# Patient Record
Sex: FEMALE | Race: BLACK OR AFRICAN AMERICAN
[De-identification: names, ages, dates, MRNs, and addresses within clinical notes are randomized per-mention and may not be internally consistent; named-entity substitution may affect disease eponyms.]

---

## 2018-05-17 ENCOUNTER — HOSPITAL ENCOUNTER (EMERGENCY)
Dept: HOSPITAL 97 - ER | Age: 33
Discharge: HOME | End: 2018-05-17
Payer: SELF-PAY
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
## 2018-05-17 DIAGNOSIS — S05.91XA: Primary | ICD-10-CM

## 2018-05-17 DIAGNOSIS — F17.210: ICD-10-CM
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
## 2018-05-17 PROCEDURE — 99282 EMERGENCY DEPT VISIT SF MDM: CPT
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
## 2018-05-17 NOTE — ER
Nurse's Notes                                                                                     

 [facility]                                                                

Name: Ayaan Esposito                                                                           

Age: 33 yrs                                                                                       

Sex: Female                                                                                       

: 1985                                                                                   

MRN: [id number]                                                                                   

Arrival Date: 2018                                                                          

Time: 08:30                                                                                       

Account#: [id number]                                                                            

Bed 15                                                                                            

Private MD: None, None                                                                            

Diagnosis: Local trauma to right eye                                                              

                                                                                                  

Presentation:                                                                                     

                                                                                             

08:41 Presenting complaint: Patient states: R eye pain and redness started last ,       hj  

      denies taking any eye drops and meds; reports trauma to the area, bat hit the R eye         

      from playing softball; reports discharges; denies blurry vision;. Transition of care:       

      patient was not received from another setting of care. Onset of symptoms was May 17,        

      2018. Initial Sepsis Screen: Does the patient meet any 2 criteria? No. Patient's            

      initial sepsis screen is negative. Does the patient have a suspected source of              

      infection? No. Patient's initial sepsis screen is negative. Care prior to arrival: None.    

08:41 Method Of Arrival: Ambulatory                                                             

08:41 Acuity: VANNESA 4                                                                           hj  

                                                                                                  

Triage Assessment:                                                                                

08:44 General: Appears in no apparent distress. uncomfortable, Behavior is calm, cooperative, hj  

      appropriate for age. Pain: Complains of pain in right eye. EENT: Reports pain               

      photophobia. Neuro: Level of Consciousness is awake, alert, obeys commands, Oriented to     

      person, place, time, situation, Appropriate for age. Cardiovascular: Capillary refill <     

      3 seconds Patient's skin is warm and dry. Respiratory: Airway is patent Respiratory         

      effort is even, unlabored, Respiratory pattern is regular, symmetrical. GI: No signs        

      and/or symptoms were reported involving the gastrointestinal system. : No signs           

      and/or symptoms were reported regarding the genitourinary system. Derm: No signs and/or     

      symptoms reported regarding the dermatologic system. Musculoskeletal: No signs and/or       

      symptoms reported regarding the musculoskeletal system.                                     

                                                                                                  

Historical:                                                                                       

- Allergies:                                                                                      

08:43 No Known Allergies;                                                                     hj  

- Home Meds:                                                                                      

08:43 None [Active];                                                                          hj  

- PMHx:                                                                                           

08:43 None;                                                                                   hj  

- PSHx:                                                                                           

08:43 None;                                                                                   hj  

                                                                                                  

- Immunization history:: Adult Immunizations up to date.                                          

- Social history:: Smoking status: Patient uses tobacco products, smokes one-half pack            

  cigarettes per day, Patient uses alcohol, occasionally.                                         

- Family history:: not pertinent.                                                                 

- Hospitalizations: : No recent hospitalization is reported.                                      

                                                                                                  

                                                                                                  

Screenin:44 Abuse screen: Denies threats or abuse. Denies injuries from another. Nutritional        hj  

      screening: No deficits noted. Tuberculosis screening: No symptoms or risk factors           

      identified. Fall Risk None identified.                                                      

                                                                                                  

Vital Signs:                                                                                      

08:45  / 100; Pulse 85; Resp 18; Temp 97.6(TE); Pulse Ox 100% on R/A; Weight 92.99 kg;  hj  

      Height 5 ft. 8 in. (172.72 cm);                                                             

08:45 Body Mass Index 31.17 (92.99 kg, 172.72 cm)                                               

                                                                                                  

ED Course:                                                                                        

08:30 Patient arrived in ED.                                                                  mr  

08:30 None, None is Private Physician.                                                        mr  

08:37 Hector Mccann MD is Attending Physician.                                                rn  

08:41 Mj Landis RN is Primary Nurse.                                                    hj  

08:42 Triage completed.                                                                       hj  

08:45 Arm band placed on right wrist.                                                         hj  

08:45 Patient has correct armband on for positive identification. Call light in reach. Side   hj  

      rails up X 1.                                                                               

08:54 Letty Victor MD is Referral Physician.                                               rn  

09:00 No provider procedures requiring assistance completed. Patient did not have IV access   hj  

      during this emergency room visit.                                                           

                                                                                                  

Administered Medications:                                                                         

No medications were administered                                                                  

                                                                                                  

                                                                                                  

Outcome:                                                                                          

08:54 Discharge ordered by MD.                                                                rn  

09:00 Discharged to home ambulatory.                                                          hj  

09:00 Condition: stable                                                                           

09:00 Discharge instructions given to patient, Instructed on discharge instructions, follow       

      up and referral plans. medication usage, Demonstrated understanding of instructions,        

      follow-up care, medications, Prescriptions given X 1.                                       

09:02 Patient left the ED.                                                                      

                                                                                                  

Signatures:                                                                                       

Vani Sams                                                                                   

Hector Mccann MD MD rn Joaquin, Henry, RN                      RN   loren                                                   

                                                                                                  

Corrections: (The following items were deleted from the chart)                                    

08:55 08:41 Presenting complaint: Patient states: R eye pain and redness started last , hj  

      denies taking any eye drops and meds; denies trauma to the area; reports discharges;        

      denies blurry vision; hj                                                                    

                                                                                                  

**************************************************************************************************

## 2019-01-14 ENCOUNTER — HOSPITAL ENCOUNTER (EMERGENCY)
Dept: HOSPITAL 97 - ER | Age: 34
Discharge: HOME | End: 2019-01-14
Payer: SELF-PAY

## 2019-01-14 DIAGNOSIS — N39.0: ICD-10-CM

## 2019-01-14 DIAGNOSIS — A59.9: ICD-10-CM

## 2019-01-14 DIAGNOSIS — R07.89: Primary | ICD-10-CM

## 2019-01-14 DIAGNOSIS — M79.622: ICD-10-CM

## 2019-01-14 LAB — UA COMPLETE W REFLEX CULTURE PNL UR: (no result)

## 2019-01-14 PROCEDURE — 87086 URINE CULTURE/COLONY COUNT: CPT

## 2019-01-14 PROCEDURE — 81025 URINE PREGNANCY TEST: CPT

## 2019-01-14 PROCEDURE — 87088 URINE BACTERIA CULTURE: CPT

## 2019-01-14 PROCEDURE — 99284 EMERGENCY DEPT VISIT MOD MDM: CPT

## 2019-01-14 PROCEDURE — 81015 MICROSCOPIC EXAM OF URINE: CPT

## 2019-01-14 PROCEDURE — 81003 URINALYSIS AUTO W/O SCOPE: CPT
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
